# Patient Record
Sex: FEMALE | Race: WHITE | NOT HISPANIC OR LATINO | Employment: FULL TIME | RURAL
[De-identification: names, ages, dates, MRNs, and addresses within clinical notes are randomized per-mention and may not be internally consistent; named-entity substitution may affect disease eponyms.]

---

## 2022-07-13 RX ORDER — ESCITALOPRAM OXALATE 10 MG/1
TABLET ORAL
Qty: 90 TABLET | Refills: 1 | Status: SHIPPED | OUTPATIENT
Start: 2022-07-13 | End: 2023-02-06

## 2022-07-13 NOTE — TELEPHONE ENCOUNTER
Last appt 01/12/22  Last rx 01/12/11 90 w/ 1.     Looks like we've only been seeing once yearly. Ill send enough to last until January.

## 2023-02-06 RX ORDER — ESCITALOPRAM OXALATE 10 MG/1
TABLET ORAL
Qty: 30 TABLET | Refills: 0 | Status: SHIPPED | OUTPATIENT
Start: 2023-02-06 | End: 2023-02-22 | Stop reason: SDUPTHER

## 2023-02-09 RX ORDER — ESCITALOPRAM OXALATE 10 MG/1
10 TABLET ORAL DAILY
Qty: 30 TABLET | Refills: 0 | OUTPATIENT
Start: 2023-02-09

## 2023-02-09 NOTE — TELEPHONE ENCOUNTER
Caller: Michaela Yun    Relationship: Self    Best call back number: [phone]    Requested Prescriptions:   Requested Prescriptions     Pending Prescriptions Disp Refills   • escitalopram (LEXAPRO) 10 MG tablet 30 tablet 0     Sig: Take 1 tablet by mouth Daily.        Pharmacy where request should be sent: [facility] [id number] [address] [phone] [facility] [phone] FX     Additional details provided by patient: PATIENT SCHEDULED VIDEO APPOINTMENT WITH ANGIE 2/22.  PATIENT STATED SHE IS A FULLTIME STUDENT, AND WORKS FULLTIME.      PATIENT HAS 4 DAYS LEFT ON HER LEXAPRO.    Does the patient have less than a 3 day supply:  [] Yes  [x] No    Would you like a call back once the refill request has been completed: [x] Yes [] No    If the office needs to give you a call back, can they leave a voicemail: [x] Yes [] No    Mina Jaimes   02/09/23 15:06 EST

## 2023-02-16 RX ORDER — ESCITALOPRAM OXALATE 10 MG/1
10 TABLET ORAL DAILY
Qty: 30 TABLET | Refills: 0 | OUTPATIENT
Start: 2023-02-16

## 2023-02-16 NOTE — TELEPHONE ENCOUNTER
Caller: Michaela Yun    Relationship: Self    Best call back number: [phone]    Requested Prescriptions:   Requested Prescriptions     Pending Prescriptions Disp Refills   • escitalopram (LEXAPRO) 10 MG tablet 30 tablet 0     Sig: Take 1 tablet by mouth Daily.        Pharmacy where request should be sent: [facility] [id number] [address] [phone] [facility] [phone] FX     Additional details provided by patient: COMPLETELY OUT. PATIENT IS REQUESTING A REFILL UNTIL APPOINTMENT SCHEDULED ON 2/22/3    Does the patient have less than a 3 day supply:  [x] Yes  [] No    Would you like a call back once the refill request has been completed: [x] Yes [] No    If the office needs to give you a call back, can they leave a voicemail: [x] Yes [] No    Mina Jo Rep   02/16/23 13:49 EST

## 2023-02-22 ENCOUNTER — TELEMEDICINE (OUTPATIENT)
Dept: FAMILY MEDICINE CLINIC | Facility: CLINIC | Age: 23
End: 2023-02-22
Payer: COMMERCIAL

## 2023-02-22 DIAGNOSIS — F41.1 GENERALIZED ANXIETY DISORDER: ICD-10-CM

## 2023-02-22 DIAGNOSIS — F33.9 DEPRESSION, RECURRENT: Primary | ICD-10-CM

## 2023-02-22 PROCEDURE — 99213 OFFICE O/P EST LOW 20 MIN: CPT | Performed by: NURSE PRACTITIONER

## 2023-02-22 RX ORDER — HYDROXYZINE PAMOATE 25 MG/1
25 CAPSULE ORAL AS NEEDED
COMMUNITY

## 2023-02-22 RX ORDER — ESCITALOPRAM OXALATE 10 MG/1
10 TABLET ORAL DAILY
Qty: 90 TABLET | Refills: 1 | Status: SHIPPED | OUTPATIENT
Start: 2023-02-22 | End: 2023-03-15

## 2023-02-22 NOTE — PROGRESS NOTES
Chief Complaint  Anxiety and Depression  This was a video and audio enabled telemedicine encounter. The patient was in home. The provider was in office.     Judy Yun presents to [facility]  History of Present Illness  Pt is here for follow up on depression and anxiety. She feels that her sx are controlled for the most part.       Objective   Vital Signs:   There were no vitals taken for this visit.    There is no height or weight on file to calculate BMI.    Review of Systems   Constitutional: Negative for fatigue and fever.   Respiratory: Negative for shortness of breath.    Cardiovascular: Negative for chest pain, palpitations and leg swelling.   Neurological: Negative for syncope.   Psychiatric/Behavioral: The patient is not nervous/anxious.           Current Outpatient Medications:   •  escitalopram (LEXAPRO) 10 MG tablet, Take 1 tablet by mouth Daily., Disp: 90 tablet, Rfl: 1  •  hydrOXYzine pamoate (VISTARIL) 25 MG capsule, Take 25 mg by mouth As Needed for Anxiety., Disp: , Rfl:       Allergies: Patient has no known allergies.    Physical Exam  Constitutional:       Appearance: Normal appearance.   Abdominal:      Tenderness: There is no abdominal tenderness.   Neurological:      Mental Status: She is alert.   Psychiatric:         Mood and Affect: Mood normal.         Behavior: Behavior normal.         Thought Content: Thought content normal.         Judgment: Judgment normal.          Result Review :                   Assessment and Plan    Diagnoses and all orders for this visit:    1. Depression, recurrent (HCC) (Primary)  Comments:  Continue Lexapro. I advised exercise and counseling as needed. Return for worsened sx and in 6 months for follow up.   Orders:  -     escitalopram (LEXAPRO) 10 MG tablet; Take 1 tablet by mouth Daily.  Dispense: 90 tablet; Refill: 1    2. Generalized anxiety disorder  Comments:  Continue Lexapro and Vistaril as needed. F/U  in 6 months.   Orders:  -     escitalopram (LEXAPRO) 10 MG tablet; Take 1 tablet by mouth Daily.  Dispense: 90 tablet; Refill: 1                Follow Up   Return in about 6 months (around 8/22/2023) for Recheck.  Patient was given instructions and counseling regarding her condition or for health maintenance advice. Please see specific information pulled into the AVS if appropriate.     TERRIE Domingo

## 2023-03-10 ENCOUNTER — TELEPHONE (OUTPATIENT)
Dept: FAMILY MEDICINE CLINIC | Facility: CLINIC | Age: 23
End: 2023-03-10
Payer: COMMERCIAL

## 2023-03-10 NOTE — TELEPHONE ENCOUNTER
Caller: Michaela Yun    Relationship to patient: Self    Best call back number: [phone]    Chief complaint: SORE THROAT, COUGH , DRAINAGE AND HEADACHE     Type of visit: SAME DAY     Requested date: 03/10/2023     Additional notes:  PATIENT WOULD LIKE A CALL BACK REGARDING GETTING INTO THE OFFICE TODAY 03/10/2023 TO BE SEEN FOR THE LISTED ABOVE CHIEF COMPLAINT AND PATIENT IS COMFORTABLE WITH SEEING ANY PROVIDER IN THE OFFICE     UNABLE TO WARM TRANSFER CALL TO THE OFFICE

## 2023-03-11 ENCOUNTER — OFFICE VISIT (OUTPATIENT)
Dept: FAMILY MEDICINE CLINIC | Facility: CLINIC | Age: 23
End: 2023-03-11
Payer: COMMERCIAL

## 2023-03-11 VITALS
HEART RATE: 109 BPM | HEIGHT: 65 IN | TEMPERATURE: 98.7 F | DIASTOLIC BLOOD PRESSURE: 66 MMHG | OXYGEN SATURATION: 96 % | BODY MASS INDEX: 26.33 KG/M2 | SYSTOLIC BLOOD PRESSURE: 118 MMHG | WEIGHT: 158 LBS

## 2023-03-11 DIAGNOSIS — R05.1 ACUTE COUGH: ICD-10-CM

## 2023-03-11 DIAGNOSIS — J02.9 SORE THROAT: Primary | ICD-10-CM

## 2023-03-11 LAB
EXPIRATION DATE: NORMAL
EXPIRATION DATE: NORMAL
FLUAV AG UPPER RESP QL IA.RAPID: NOT DETECTED
FLUBV AG UPPER RESP QL IA.RAPID: NOT DETECTED
INTERNAL CONTROL: NORMAL
INTERNAL CONTROL: NORMAL
Lab: NORMAL
Lab: NORMAL
S PYO AG THROAT QL: NEGATIVE
SARS-COV-2 AG UPPER RESP QL IA.RAPID: NOT DETECTED

## 2023-03-11 PROCEDURE — 87880 STREP A ASSAY W/OPTIC: CPT | Performed by: PHYSICIAN ASSISTANT

## 2023-03-11 PROCEDURE — 87428 SARSCOV & INF VIR A&B AG IA: CPT | Performed by: PHYSICIAN ASSISTANT

## 2023-03-11 PROCEDURE — 99213 OFFICE O/P EST LOW 20 MIN: CPT | Performed by: PHYSICIAN ASSISTANT

## 2023-03-11 RX ORDER — BENZONATATE 100 MG/1
CAPSULE ORAL
Qty: 30 CAPSULE | Refills: 0 | Status: SHIPPED | OUTPATIENT
Start: 2023-03-11

## 2023-03-11 NOTE — PROGRESS NOTES
"Chief Complaint  Sore Throat (Sinus drainage, sore throat, coughing, started 4-5 days ago )    Judy Yun presents to [facility]  History of Present Illness  Patient in today for evaluation on nasal congestion ,sore throat, and cough that started 4 days ago. Denies any fever. No vomiting or diarrhea. She feels the throat is sore from the cough. Been using otc meds without benefit for cough.   Sore Throat   The current episode started in the past 7 days. There has been no fever. Associated symptoms include congestion and coughing. Pertinent negatives include no abdominal pain, diarrhea, ear pain, shortness of breath or vomiting.       Objective   Vital Signs:   /66 (BP Location: Left arm, Patient Position: Sitting, Cuff Size: Adult)   Pulse 109   Temp 98.7 °F (37.1 °C)   Ht 165.1 cm (65\")   Wt 71.7 kg (158 lb)   SpO2 96%   BMI 26.29 kg/m²     Body mass index is 26.29 kg/m².    Review of Systems   Constitutional: Negative for fever.   HENT: Positive for congestion and sore throat. Negative for ear pain.    Respiratory: Positive for cough. Negative for shortness of breath and wheezing.    Cardiovascular: Negative for chest pain.   Gastrointestinal: Negative for abdominal pain, diarrhea, nausea and vomiting.   Neurological: Negative for dizziness and headache.       Past History:  Medical History: has a past medical history of Anxiety and Depression.   Surgical History: has no past surgical history on file.   Family History: family history includes Colon cancer in her maternal grandfather; Diabetes in her maternal grandfather; Mental illness in her maternal grandfather.   Social History: reports that she has never smoked. She has never used smokeless tobacco.      Current Outpatient Medications:   •  escitalopram (LEXAPRO) 10 MG tablet, Take 1 tablet by mouth Daily., Disp: 90 tablet, Rfl: 1  •  hydrOXYzine pamoate (VISTARIL) 25 MG capsule, Take 1 capsule by " mouth As Needed for Anxiety., Disp: , Rfl:   •  benzonatate (Tessalon Perles) 100 MG capsule, Take one capsule every 8 hours, as needed for cough, Disp: 30 capsule, Rfl: 0  Allergies: Amoxicillin    Physical Exam  Constitutional:       Appearance: Normal appearance.   HENT:      Right Ear: Tympanic membrane normal.      Left Ear: Tympanic membrane normal.      Mouth/Throat:      Pharynx: Oropharynx is clear.   Eyes:      Conjunctiva/sclera: Conjunctivae normal.      Pupils: Pupils are equal, round, and reactive to light.   Cardiovascular:      Rate and Rhythm: Normal rate and regular rhythm.      Heart sounds: Normal heart sounds.   Pulmonary:      Effort: Pulmonary effort is normal.      Breath sounds: Normal breath sounds.   Abdominal:      Tenderness: There is no abdominal tenderness.   Neurological:      Mental Status: She is oriented to person, place, and time.   Psychiatric:         Mood and Affect: Mood normal.         Behavior: Behavior normal.             Assessment and Plan   Diagnoses and all orders for this visit:    1. Sore throat (Primary)  -     Covid-19 + Flu A&B AG, Veritor  -     POC Rapid Strep A  Rapid strep, covid and flu testing was negative. Offered throat culture- patient declined.  Recommend symptom management along with good hydration and rest. RTC if not improving.   2. Acute cough    Other orders  -     benzonatate (Tessalon Perles) 100 MG capsule; Take one capsule every 8 hours, as needed for cough  Dispense: 30 capsule; Refill: 0            Follow Up   No follow-ups on file.  Patient was given instructions and counseling regarding her condition or for health maintenance advice. Please see specific information pulled into the AVS if appropriate.     Diana Mcmahon PA-C

## 2023-03-15 DIAGNOSIS — F33.9 DEPRESSION, RECURRENT: ICD-10-CM

## 2023-03-15 DIAGNOSIS — F41.1 GENERALIZED ANXIETY DISORDER: ICD-10-CM

## 2023-03-15 RX ORDER — ESCITALOPRAM OXALATE 10 MG/1
TABLET ORAL
Qty: 30 TABLET | Refills: 1 | Status: SHIPPED | OUTPATIENT
Start: 2023-03-15

## 2023-05-04 ENCOUNTER — TELEPHONE (OUTPATIENT)
Dept: FAMILY MEDICINE CLINIC | Facility: CLINIC | Age: 23
End: 2023-05-04
Payer: COMMERCIAL

## 2023-05-04 DIAGNOSIS — F41.1 GENERALIZED ANXIETY DISORDER: ICD-10-CM

## 2023-05-04 RX ORDER — HYDROXYZINE PAMOATE 25 MG/1
25 CAPSULE ORAL EVERY 6 HOURS PRN
Qty: 30 CAPSULE | Refills: 3 | Status: SHIPPED | OUTPATIENT
Start: 2023-05-04

## 2023-05-04 RX ORDER — HYDROXYZINE PAMOATE 25 MG/1
25 CAPSULE ORAL AS NEEDED
Qty: 30 CAPSULE | Refills: 3 | Status: SHIPPED | OUTPATIENT
Start: 2023-05-04 | End: 2023-05-04 | Stop reason: SDUPTHER

## 2023-05-04 NOTE — TELEPHONE ENCOUNTER
Caller: Michaela Yun    Relationship: Self    Best call back number:    [phone] (Mobile)       Requested Prescriptions:   Requested Prescriptions     Pending Prescriptions Disp Refills   • hydrOXYzine pamoate (VISTARIL) 25 MG capsule       Sig: Take 1 capsule by mouth As Needed for Anxiety.        Pharmacy where request should be sent: [facility] [id number] [address] [phone] [facility] [phone] FX     Last office visit with prescribing clinician: Visit date not found   Last telemedicine visit with prescribing clinician: Visit date not found   Next office visit with prescribing clinician: Visit date not found     Additional details provided by patient: HAVE NONE LEFT, NEED REFILLS     Does the patient have less than a 3 day supply:  [x] Yes  [] No    Would you like a call back once the refill request has been completed: [] Yes [] No    If the office needs to give you a call back, can they leave a voicemail: [] Yes [] No    Mina Obrien Rep   05/04/23 11:44 EDT

## 2023-05-04 NOTE — TELEPHONE ENCOUNTER
Caller: [facility] [phone] - AYALA FABIAN  Sarah [address] - [phone] [facility] [phone] FX    Relationship: Pharmacy    Best call back number: [phone]    What is the best time to reach you: ANY TIME     Who are you requesting to speak with (clinical staff, provider,  specific staff member): CLINICAL      What was the call regarding: hydrOXYzine pamoate (VISTARIL) 25 MG capsule - DIRECTIONS SAY AS NEEDED HOWEVER PHARMACY NEEDS TO KNOW HOW MANY A DAY. PLEASE ADVISE     Do you require a callback: YES

## 2023-09-28 DIAGNOSIS — F33.9 DEPRESSION, RECURRENT: ICD-10-CM

## 2023-09-28 DIAGNOSIS — F41.1 GENERALIZED ANXIETY DISORDER: ICD-10-CM

## 2023-09-28 RX ORDER — ESCITALOPRAM OXALATE 10 MG/1
10 TABLET ORAL DAILY
Qty: 30 TABLET | Refills: 1 | Status: SHIPPED | OUTPATIENT
Start: 2023-09-28

## 2023-09-28 NOTE — TELEPHONE ENCOUNTER
Pt needs to be moved to bahman grissom schedule. She was put on Bree Ackermans but Bahman needs to see her since she prescribes it.

## 2023-09-28 NOTE — TELEPHONE ENCOUNTER
Caller: Michaela Yun    Relationship: Self    Best call back number:[phone]    Requested Prescriptions:   Requested Prescriptions     Pending Prescriptions Disp Refills    escitalopram (LEXAPRO) 10 MG tablet 30 tablet 1     Sig: Take 1 tablet by mouth Daily.        Pharmacy where request should be sent:  [facility] [id number] [address] [phone] [facility] [phone] FX        Last office visit with prescribing clinician: Visit date not found   Last telemedicine visit with prescribing clinician: Visit date not found   Next office visit with prescribing clinician: Visit date not found     Additional details provided by patient: PT IS SCHEDULED FOR 10/5    Does the patient have less than a 3 day supply:  [x] Yes  [] No    Would you like a call back once the refill request has been completed: [] Yes [x] No    If the office needs to give you a call back, can they leave a voicemail: [] Yes [x] No    Mina Stone   09/28/23 09:20 EDT

## 2023-09-29 NOTE — TELEPHONE ENCOUNTER
Tried to contact patient, does not have vm set up. Patient needs to be scheduled with Tatiana Morris not Bree Hancock.

## 2023-09-30 NOTE — TELEPHONE ENCOUNTER
"CALL ATTEMPT #2: Tried to reach patient regarding appointment on Oct 5th with Bree Hancock will have to be rescheduled due to Crystal prescribing her Lexapro.     HUB TO READ: \"The appointment on October 5th with Bree Hancock will need to be rescheduled with Crystal due to Crystal Arturo prescribing your lexapro.\"  "